# Patient Record
Sex: FEMALE | Race: WHITE
[De-identification: names, ages, dates, MRNs, and addresses within clinical notes are randomized per-mention and may not be internally consistent; named-entity substitution may affect disease eponyms.]

---

## 2017-03-20 ENCOUNTER — HOSPITAL ENCOUNTER (OUTPATIENT)
Dept: HOSPITAL 35 - ULTRA | Age: 82
End: 2017-03-20
Attending: FAMILY MEDICINE
Payer: COMMERCIAL

## 2017-03-20 DIAGNOSIS — M79.604: ICD-10-CM

## 2017-03-20 DIAGNOSIS — I82.401: Primary | ICD-10-CM

## 2017-03-20 DIAGNOSIS — M79.89: ICD-10-CM

## 2017-06-05 ENCOUNTER — HOSPITAL ENCOUNTER (OUTPATIENT)
Dept: HOSPITAL 35 - MRI | Age: 82
End: 2017-06-05
Attending: FAMILY MEDICINE
Payer: COMMERCIAL

## 2017-06-05 DIAGNOSIS — M48.06: ICD-10-CM

## 2017-06-05 DIAGNOSIS — M51.36: Primary | ICD-10-CM

## 2017-06-20 ENCOUNTER — HOSPITAL ENCOUNTER (OUTPATIENT)
Dept: HOSPITAL 35 - PAIN | Age: 82
Discharge: HOME | End: 2017-06-20
Attending: ANESTHESIOLOGY
Payer: COMMERCIAL

## 2017-06-20 VITALS — HEIGHT: 65.98 IN | WEIGHT: 144 LBS | BODY MASS INDEX: 23.14 KG/M2

## 2017-06-20 VITALS — SYSTOLIC BLOOD PRESSURE: 192 MMHG | DIASTOLIC BLOOD PRESSURE: 100 MMHG

## 2017-06-20 DIAGNOSIS — M19.90: ICD-10-CM

## 2017-06-20 DIAGNOSIS — M47.27: ICD-10-CM

## 2017-06-20 DIAGNOSIS — M48.06: ICD-10-CM

## 2017-06-20 DIAGNOSIS — M46.96: ICD-10-CM

## 2017-06-20 DIAGNOSIS — M51.16: Primary | ICD-10-CM

## 2017-06-20 DIAGNOSIS — I25.10: ICD-10-CM

## 2017-06-20 DIAGNOSIS — I10: ICD-10-CM

## 2017-06-20 DIAGNOSIS — E03.9: ICD-10-CM

## 2017-06-20 DIAGNOSIS — Z79.01: ICD-10-CM

## 2017-06-20 DIAGNOSIS — Z79.899: ICD-10-CM

## 2017-06-20 DIAGNOSIS — G89.29: ICD-10-CM

## 2017-06-20 DIAGNOSIS — K21.9: ICD-10-CM

## 2017-06-20 DIAGNOSIS — E78.5: ICD-10-CM

## 2017-06-20 LAB
INR PPP: 1.2
PROTHROMBIN TIME: 12 SECONDS (ref 9.3–11.4)

## 2017-06-29 ENCOUNTER — HOSPITAL ENCOUNTER (OUTPATIENT)
Dept: HOSPITAL 61 - PCVCCLINIC | Age: 82
Discharge: HOME | End: 2017-06-29
Attending: INTERNAL MEDICINE
Payer: MEDICARE

## 2017-06-29 DIAGNOSIS — I10: ICD-10-CM

## 2017-06-29 DIAGNOSIS — Z88.2: ICD-10-CM

## 2017-06-29 DIAGNOSIS — Z86.718: ICD-10-CM

## 2017-06-29 DIAGNOSIS — E78.5: ICD-10-CM

## 2017-06-29 DIAGNOSIS — I48.91: Primary | ICD-10-CM

## 2017-06-29 DIAGNOSIS — R43.2: ICD-10-CM

## 2017-06-29 DIAGNOSIS — Z79.01: ICD-10-CM

## 2017-06-29 DIAGNOSIS — Z79.899: ICD-10-CM

## 2017-06-29 PROCEDURE — G0463 HOSPITAL OUTPT CLINIC VISIT: HCPCS

## 2017-07-11 ENCOUNTER — HOSPITAL ENCOUNTER (OUTPATIENT)
Dept: HOSPITAL 61 - PCVCIMAG | Age: 82
Discharge: HOME | End: 2017-07-11
Attending: INTERNAL MEDICINE
Payer: MEDICARE

## 2017-07-11 DIAGNOSIS — E78.5: ICD-10-CM

## 2017-07-11 DIAGNOSIS — I45.10: Primary | ICD-10-CM

## 2017-07-11 DIAGNOSIS — I10: ICD-10-CM

## 2017-07-11 DIAGNOSIS — Z79.01: ICD-10-CM

## 2017-07-11 DIAGNOSIS — I48.92: ICD-10-CM

## 2017-07-11 DIAGNOSIS — I48.91: ICD-10-CM

## 2017-07-11 PROCEDURE — 78452 HT MUSCLE IMAGE SPECT MULT: CPT

## 2017-07-11 PROCEDURE — 93017 CV STRESS TEST TRACING ONLY: CPT

## 2017-07-11 PROCEDURE — A9500 TC99M SESTAMIBI: HCPCS

## 2017-07-13 NOTE — PCVCIMAG
--------------- APPROVED REPORT --------------





Exam: Nuclear Stress Test

Indication: Afib

Patient Location: Out-Patient

Stress Nurse: Carmita Mariano RN

NM Tech:Nacho Maurer NMTCB



Ht: 5 ft 6 in Wt: 140 lbs BSA:  1.72 m2

HR: 68 bpm                      BP: 191/84 mmHg         BMI:  

22.5

Rhythm:  RBBB



Medical History

Medical History: Age, HTN, Afib,

Medications: Lisinopril, Mevacor, Coumadin

Allergies: Bactrim

Pretest Chest Pain Characteristics: No chest pain

Exercise History: Physically active

Physical Disabilities: Uses a walker



NM EXAM: Myocardial Perfusion REST/STRESS

Imaging Protocol: Rest Tc-99m/Stress Tc-99m 1 day



Resting Data

Rest SPECT myocardial perfusion imaging was performed in supine 

position 45 minutes following the intravenous injection of 8.4 mCi of 

Tc-99m Sestamibi.

Time of rest injection: 0910     Date: 07/11/2017



Pharmacologic Stress

Pharmacologic stress test was performed by injecting Regadenoson 0.4 

mg IV push followed by the intravenous injection of 25 mCi of Tc-99m 

Sestamibi.

Time of stress injection: 1030     Date: 07/11/2017

The images were gated to evaluate regional wall motion and calculate 

left ventricular ejection fraction. 



Study Data

Post stress, the left ventricular ejection was 67%..

SSS: 0

SRS: 0

SDS: 0

TID = 0.78.



Perfusion

Normal left ventricular perfusion.



Wall Motion

Normal left ventricular wall motion.



Nuclear Conclusion

1. low risk study 



Interpreted by:  Ghassan Goins MD

Electronically Approved: 07/13/2017 16:07:56



Stress Test Details

Stress Test:  Pharmacologic stress testing performed using 0.4 mg of 

regadenoson per 5 mL given IV over 10 seconds.

  Reason for pharmacologic stress test: physical 

limitation.

HR

Resting HR:            68 bpmMax Heart Rate (APMHR): 135 bpm 

Max HR Achieved:  89 bpmTarget HR (85% APMHR): 114 bpm

% of APMHR:         65

Recovery HR:            85 bpm



BP

Resting BP:  191/84 mmHg

Max BP:       169/74 mmHg

Recovery BP:       140/62 mmHg

ECG

Resting ECG:  Sinus Rhythm, RBBB

Stress ECG:     Sinus Rhythm, RBBB

Recovery ECG: Sinus Rhythm, RBBB



Clinical

Reason for Termination: Completed protocol

Stress Symptoms: Chest Pain,

Symptoms resolved with caffeine.



Stress ECG Conclusion

1. ADEQUATE RESPONSE TO IV LEXISCAN

2. INADEQUATE HEART RATE FOR ECG DIAGNOSIS



&lt;Conclusion&gt;

1. ADEQUATE RESPONSE TO IV LEXISCAN

2. INADEQUATE HEART RATE FOR ECG DIAGNOSIS

## 2017-07-18 ENCOUNTER — HOSPITAL ENCOUNTER (OUTPATIENT)
Dept: HOSPITAL 35 - PAIN | Age: 82
Discharge: HOME | End: 2017-07-18
Attending: ANESTHESIOLOGY
Payer: COMMERCIAL

## 2017-07-18 VITALS — HEIGHT: 65.98 IN | BODY MASS INDEX: 22.63 KG/M2 | WEIGHT: 140.8 LBS

## 2017-07-18 VITALS — DIASTOLIC BLOOD PRESSURE: 75 MMHG | SYSTOLIC BLOOD PRESSURE: 155 MMHG

## 2017-07-18 DIAGNOSIS — Z79.899: ICD-10-CM

## 2017-07-18 DIAGNOSIS — M47.27: ICD-10-CM

## 2017-07-18 DIAGNOSIS — Z98.890: ICD-10-CM

## 2017-07-18 DIAGNOSIS — M12.88: ICD-10-CM

## 2017-07-18 DIAGNOSIS — M51.16: Primary | ICD-10-CM

## 2017-07-18 DIAGNOSIS — G89.29: ICD-10-CM

## 2017-07-18 DIAGNOSIS — M48.06: ICD-10-CM

## 2017-09-10 ENCOUNTER — HOSPITAL ENCOUNTER (INPATIENT)
Dept: HOSPITAL 35 - ER | Age: 82
LOS: 5 days | Discharge: NURSING, REHAB OR LONG-TERM CARE | DRG: 552 | End: 2017-09-15
Attending: FAMILY MEDICINE | Admitting: FAMILY MEDICINE
Payer: COMMERCIAL

## 2017-09-10 VITALS
SYSTOLIC BLOOD PRESSURE: 188 MMHG | HEIGHT: 65.98 IN | WEIGHT: 126 LBS | BODY MASS INDEX: 20.25 KG/M2 | DIASTOLIC BLOOD PRESSURE: 70 MMHG

## 2017-09-10 VITALS — DIASTOLIC BLOOD PRESSURE: 52 MMHG | SYSTOLIC BLOOD PRESSURE: 127 MMHG

## 2017-09-10 VITALS — SYSTOLIC BLOOD PRESSURE: 162 MMHG | DIASTOLIC BLOOD PRESSURE: 77 MMHG

## 2017-09-10 VITALS — DIASTOLIC BLOOD PRESSURE: 72 MMHG | SYSTOLIC BLOOD PRESSURE: 168 MMHG

## 2017-09-10 VITALS — DIASTOLIC BLOOD PRESSURE: 61 MMHG | SYSTOLIC BLOOD PRESSURE: 145 MMHG
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
## 2017-09-10 DIAGNOSIS — K59.00: ICD-10-CM

## 2017-09-10 DIAGNOSIS — I10: ICD-10-CM

## 2017-09-10 DIAGNOSIS — M19.90: ICD-10-CM

## 2017-09-10 DIAGNOSIS — B96.20: ICD-10-CM

## 2017-09-10 DIAGNOSIS — M48.00: Primary | ICD-10-CM

## 2017-09-10 DIAGNOSIS — R63.4: ICD-10-CM

## 2017-09-10 DIAGNOSIS — I95.89: ICD-10-CM
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
## 2017-09-10 DIAGNOSIS — Z86.718: ICD-10-CM

## 2017-09-10 DIAGNOSIS — Z79.01: ICD-10-CM

## 2017-09-10 DIAGNOSIS — E03.9: ICD-10-CM

## 2017-09-10 DIAGNOSIS — E78.00: ICD-10-CM

## 2017-09-10 DIAGNOSIS — Z79.899: ICD-10-CM

## 2017-09-10 DIAGNOSIS — I48.2: ICD-10-CM
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
## 2017-09-10 DIAGNOSIS — R00.1: ICD-10-CM
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
## 2017-09-10 DIAGNOSIS — N39.0: ICD-10-CM

## 2017-09-10 LAB
ANION GAP SERPL CALC-SCNC: 9 MMOL/L (ref 7–16)
BILIRUB UR-MCNC: NEGATIVE MG/DL
BUN SERPL-MCNC: 37 MG/DL (ref 7–18)
CALCIUM SERPL-MCNC: 9.2 MG/DL (ref 8.5–10.1)
CHLORIDE SERPL-SCNC: 98 MMOL/L (ref 98–107)
CO2 SERPL-SCNC: 28 MMOL/L (ref 21–32)
COLOR UR: YELLOW
CREAT SERPL-MCNC: 1.4 MG/DL (ref 0.6–1)
ERYTHROCYTE [DISTWIDTH] IN BLOOD BY AUTOMATED COUNT: 14.7 % (ref 10.5–14.5)
GLUCOSE SERPL-MCNC: 104 MG/DL (ref 74–106)
HCT VFR BLD CALC: 43.9 % (ref 37–47)
HGB BLD-MCNC: 14.8 GM/DL (ref 12–15)
INR PPP: 1.1
KETONES UR STRIP-MCNC: NEGATIVE MG/DL
MCH RBC QN AUTO: 31.3 PG (ref 26–34)
MCHC RBC AUTO-ENTMCNC: 33.8 G/DL (ref 28–37)
MCV RBC: 92.6 FL (ref 80–100)
PLATELET # BLD: 153 THOU/UL (ref 150–400)
POTASSIUM SERPL-SCNC: 4.2 MMOL/L (ref 3.5–5.1)
PROTHROMBIN TIME: 10.9 SECONDS (ref 9.3–11.4)
RBC # BLD AUTO: 4.74 MIL/UL (ref 4.2–5)
RBC # UR STRIP: NEGATIVE /UL
SODIUM SERPL-SCNC: 135 MMOL/L (ref 136–145)
SP GR UR STRIP: 1.01 (ref 1–1.03)
SQUAMOUS: (no result) /LPF (ref 0–3)
URINE GLUCOSE-RANDOM*: NEGATIVE
URINE LEUKOCYTES-REFLEX: NEGATIVE
URINE PROTEIN (DIPSTICK): NEGATIVE
URINE WBC-REFLEX: (no result) /HPF (ref 0–5)
UROBILINOGEN UR STRIP-ACNC: 0.2 E.U./DL (ref 0.2–1)
WBC # BLD AUTO: 9.9 THOU/UL (ref 4–11)

## 2017-09-10 PROCEDURE — 10091: CPT

## 2017-09-10 PROCEDURE — 10183: CPT

## 2017-09-11 VITALS — DIASTOLIC BLOOD PRESSURE: 63 MMHG | SYSTOLIC BLOOD PRESSURE: 169 MMHG

## 2017-09-11 VITALS — DIASTOLIC BLOOD PRESSURE: 54 MMHG | SYSTOLIC BLOOD PRESSURE: 129 MMHG

## 2017-09-11 VITALS — DIASTOLIC BLOOD PRESSURE: 50 MMHG | SYSTOLIC BLOOD PRESSURE: 119 MMHG

## 2017-09-11 VITALS — DIASTOLIC BLOOD PRESSURE: 61 MMHG | SYSTOLIC BLOOD PRESSURE: 111 MMHG

## 2017-09-11 LAB
INR PPP: 1
PROTHROMBIN TIME: 10.6 SECONDS (ref 9.3–11.4)

## 2017-09-12 VITALS — SYSTOLIC BLOOD PRESSURE: 100 MMHG | DIASTOLIC BLOOD PRESSURE: 40 MMHG

## 2017-09-12 VITALS — SYSTOLIC BLOOD PRESSURE: 145 MMHG | DIASTOLIC BLOOD PRESSURE: 55 MMHG

## 2017-09-12 VITALS — DIASTOLIC BLOOD PRESSURE: 46 MMHG | SYSTOLIC BLOOD PRESSURE: 111 MMHG

## 2017-09-12 VITALS — SYSTOLIC BLOOD PRESSURE: 108 MMHG | DIASTOLIC BLOOD PRESSURE: 50 MMHG

## 2017-09-12 VITALS — DIASTOLIC BLOOD PRESSURE: 61 MMHG | SYSTOLIC BLOOD PRESSURE: 176 MMHG

## 2017-09-13 VITALS — SYSTOLIC BLOOD PRESSURE: 144 MMHG | DIASTOLIC BLOOD PRESSURE: 62 MMHG

## 2017-09-13 VITALS — DIASTOLIC BLOOD PRESSURE: 35 MMHG | SYSTOLIC BLOOD PRESSURE: 98 MMHG

## 2017-09-13 VITALS — SYSTOLIC BLOOD PRESSURE: 71 MMHG | DIASTOLIC BLOOD PRESSURE: 32 MMHG

## 2017-09-13 VITALS — SYSTOLIC BLOOD PRESSURE: 114 MMHG | DIASTOLIC BLOOD PRESSURE: 52 MMHG

## 2017-09-13 VITALS — DIASTOLIC BLOOD PRESSURE: 63 MMHG | SYSTOLIC BLOOD PRESSURE: 165 MMHG

## 2017-09-13 VITALS — DIASTOLIC BLOOD PRESSURE: 51 MMHG | SYSTOLIC BLOOD PRESSURE: 133 MMHG

## 2017-09-13 VITALS — DIASTOLIC BLOOD PRESSURE: 51 MMHG | SYSTOLIC BLOOD PRESSURE: 113 MMHG

## 2017-09-13 LAB
ERYTHROCYTE [DISTWIDTH] IN BLOOD BY AUTOMATED COUNT: 14.7 % (ref 10.5–14.5)
HCT VFR BLD CALC: 37.8 % (ref 37–47)
HGB BLD-MCNC: 12.6 GM/DL (ref 12–15)
MCH RBC QN AUTO: 31.2 PG (ref 26–34)
MCHC RBC AUTO-ENTMCNC: 33.5 G/DL (ref 28–37)
MCV RBC: 93.1 FL (ref 80–100)
PLATELET # BLD: 132 THOU/UL (ref 150–400)
RBC # BLD AUTO: 4.06 MIL/UL (ref 4.2–5)
WBC # BLD AUTO: 7.7 THOU/UL (ref 4–11)

## 2017-09-14 VITALS — DIASTOLIC BLOOD PRESSURE: 50 MMHG | SYSTOLIC BLOOD PRESSURE: 110 MMHG

## 2017-09-14 VITALS — DIASTOLIC BLOOD PRESSURE: 76 MMHG | SYSTOLIC BLOOD PRESSURE: 167 MMHG

## 2017-09-14 VITALS — SYSTOLIC BLOOD PRESSURE: 157 MMHG | DIASTOLIC BLOOD PRESSURE: 87 MMHG

## 2017-09-14 VITALS — DIASTOLIC BLOOD PRESSURE: 49 MMHG | SYSTOLIC BLOOD PRESSURE: 128 MMHG

## 2017-09-15 VITALS — SYSTOLIC BLOOD PRESSURE: 177 MMHG | DIASTOLIC BLOOD PRESSURE: 74 MMHG

## 2017-09-15 VITALS — DIASTOLIC BLOOD PRESSURE: 59 MMHG | SYSTOLIC BLOOD PRESSURE: 147 MMHG

## 2018-05-15 ENCOUNTER — HOSPITAL ENCOUNTER (OUTPATIENT)
Dept: HOSPITAL 61 - PCVCCLINIC | Age: 83
Discharge: HOME | End: 2018-05-15
Attending: INTERNAL MEDICINE
Payer: MEDICARE

## 2018-05-15 DIAGNOSIS — I48.0: ICD-10-CM

## 2018-05-15 DIAGNOSIS — Z79.899: ICD-10-CM

## 2018-05-15 DIAGNOSIS — I10: Primary | ICD-10-CM

## 2018-05-15 PROCEDURE — 80061 LIPID PANEL: CPT

## 2018-08-07 ENCOUNTER — HOSPITAL ENCOUNTER (OUTPATIENT)
Dept: HOSPITAL 35 - PAIN | Age: 83
Discharge: HOME | End: 2018-08-07
Attending: ANESTHESIOLOGY
Payer: COMMERCIAL

## 2018-08-07 VITALS — DIASTOLIC BLOOD PRESSURE: 74 MMHG | SYSTOLIC BLOOD PRESSURE: 136 MMHG

## 2018-08-07 VITALS — HEIGHT: 65.98 IN | WEIGHT: 139.2 LBS | BODY MASS INDEX: 22.37 KG/M2

## 2018-08-07 DIAGNOSIS — M46.96: ICD-10-CM

## 2018-08-07 DIAGNOSIS — Z87.440: ICD-10-CM

## 2018-08-07 DIAGNOSIS — M51.16: Primary | ICD-10-CM

## 2018-08-07 DIAGNOSIS — Z79.899: ICD-10-CM

## 2018-08-07 DIAGNOSIS — G89.29: ICD-10-CM

## 2018-08-07 DIAGNOSIS — M47.27: ICD-10-CM

## 2018-08-07 DIAGNOSIS — Z98.890: ICD-10-CM

## 2018-08-07 DIAGNOSIS — M48.061: ICD-10-CM

## 2018-09-24 ENCOUNTER — HOSPITAL ENCOUNTER (OUTPATIENT)
Dept: HOSPITAL 35 - MRI | Age: 83
End: 2018-09-24
Attending: FAMILY MEDICINE
Payer: COMMERCIAL

## 2018-09-24 DIAGNOSIS — M48.061: ICD-10-CM
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
## 2018-09-24 DIAGNOSIS — S22.080A: Primary | ICD-10-CM
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
## 2018-11-15 ENCOUNTER — HOSPITAL ENCOUNTER (OUTPATIENT)
Dept: HOSPITAL 61 - PCVCCLINIC | Age: 83
Discharge: HOME | End: 2018-11-15
Attending: INTERNAL MEDICINE
Payer: MEDICARE

## 2018-11-15 DIAGNOSIS — I48.91: ICD-10-CM

## 2018-11-15 DIAGNOSIS — E03.9: ICD-10-CM

## 2018-11-15 DIAGNOSIS — E78.5: Primary | ICD-10-CM

## 2018-11-15 DIAGNOSIS — I48.0: ICD-10-CM

## 2018-11-15 PROCEDURE — 80061 LIPID PANEL: CPT

## 2018-11-15 PROCEDURE — 36415 COLL VENOUS BLD VENIPUNCTURE: CPT

## 2018-12-03 ENCOUNTER — HOSPITAL ENCOUNTER (OUTPATIENT)
Dept: HOSPITAL 61 - PCVCIMAG | Age: 83
Discharge: HOME | End: 2018-12-03
Attending: INTERNAL MEDICINE
Payer: MEDICARE

## 2018-12-03 DIAGNOSIS — R60.0: ICD-10-CM

## 2018-12-03 DIAGNOSIS — I08.3: ICD-10-CM

## 2018-12-03 DIAGNOSIS — I48.92: ICD-10-CM

## 2018-12-03 DIAGNOSIS — E78.5: ICD-10-CM

## 2018-12-03 DIAGNOSIS — I10: ICD-10-CM

## 2018-12-03 DIAGNOSIS — I48.0: ICD-10-CM

## 2018-12-03 DIAGNOSIS — Z01.810: Primary | ICD-10-CM

## 2018-12-03 PROCEDURE — 93306 TTE W/DOPPLER COMPLETE: CPT

## 2018-12-03 PROCEDURE — G0463 HOSPITAL OUTPT CLINIC VISIT: HCPCS

## 2018-12-03 NOTE — PCVCIMAG
--------------- APPROVED REPORT --------------





Study performed:  2018 10:20:25



EXAM: Comprehensive 2D, Doppler, and color-flow 

Echocardiogram

Patient Location: Echo lab

Status:  routine



BSA:         1.73

HR: 90 bpmBP:          120/68 mmHg

Rhythm: Atrial Fibrillation



Other Information 

Study Quality: Adequate



Risk Factors: 

Cardiac Risk Factors:  HTN, Hyperlipidemia



Indications

Atrial Fibrillation



2D Dimensions

IVSd:  9.80 (7-11mm)LVOT Diam:  20.00 (18-24mm) 

LVDd:  40.33 mm

PWd:  10.07 (7-11mm)Ascending Ao:  28.40 (22-36mm)

LVDs:  30.26 (25-40mm)

Left Atrium:  41.18 (27-40mm)

Aortic Root:  28.21 mm

LV Single Plane 4CH:  53.06 %

LV Single Plane 2CH:  48.74 %

Biplane EF:  50.3 %



Volumes

Left Atrial Volume (Systole)

Single Plane 4CH:  73.62 mLSingle Plane 2CH:  53.49 mL

LA ESV Index:  40.00 mL/m2



Aortic Valve

AoV Peak Richard.:  0.90 m/s

AO Peak Gr.:  3.26 mmHgLVOT Max P.18 mmHg

LVOT Max V:  0.74 m/s

DALLIN Vmax: 2.58 cm2

AI Vmax:  4.05 m/s

AI [place]:  2.17 m/s2

AI PHT:  553.05 ms



Pulmonary Valve

PV Peak Richard.:  0.71 m/sPV Peak Gr.:  2.04 mmHg



Tricuspid Valve

TR Peak Richard.:  2.51 m/sRAP Estimate:  7.00 mmHg

TR Peak Gr.:  25.26 mmHg

PA Pressure:  32.00 mmHg



Left Ventricle

The left ventricle is normal size. There is normal LV segmental wall 

motion. There is normal left ventricular wall thickness. Left 

ventricular systolic function is normal. The left ventricular 

ejection fraction is within the normal range. LVEF is 50-55%. This 

study is not technically sufficient to allow evaluation of the LV 

diastolic function due to atrial fibrillation.



Right Ventricle

The right ventricle is normal size. The right ventricular systolic 

function is normal.



Atria

Left atrium is mildly dilated. Right atrium is dilated.



Aortic Valve

The Aortic valve is sclerotic. Mild to moderate aortic regurgitation. 

There is no aortic valvular stenosis.



Mitral Valve

There is mitral annular calcification. Mild mitral regurgitation. No 

evidence of mitral valve stenosis.



Tricuspid Valve

The tricuspid valve is normal in structure. Mild tricuspid 

regurgitation. Pulmonary artery pressure is 32 mmHg.



Pulmonic Valve

The pulmonary valve is normal in structure. Trace pulmonic 

regurgitation.



Great Vessels

The aortic root is normal in size. IVC is normal in size and 

collapses &gt;50% with inspiration.



Pericardium

There is no pericardial effusion.



&lt;Conclusion&gt;

The left ventricle is normal size.

LVEF is 50-55%.

Left atrium is mildly dilated.

Right atrium is dilated.

The Aortic valve is sclerotic.

Mild to moderate aortic regurgitation.

There is mitral annular calcification.

Mild mitral regurgitation.

The tricuspid valve is normal in structure.

Mild tricuspid regurgitation. Pulmonary artery pressure is 32 

mmHg.

The pulmonary valve is normal in structure.

Trace pulmonic regurgitation.

There is no pericardial effusion.

## 2019-01-03 ENCOUNTER — HOSPITAL ENCOUNTER (OUTPATIENT)
Dept: HOSPITAL 61 - PCVCCLINIC | Age: 84
Discharge: HOME | End: 2019-01-03
Attending: INTERNAL MEDICINE
Payer: MEDICARE

## 2019-01-03 DIAGNOSIS — I48.91: ICD-10-CM

## 2019-01-03 DIAGNOSIS — I38: ICD-10-CM

## 2019-01-03 DIAGNOSIS — Z01.810: Primary | ICD-10-CM

## 2019-01-03 DIAGNOSIS — I10: ICD-10-CM

## 2019-01-03 PROCEDURE — G0463 HOSPITAL OUTPT CLINIC VISIT: HCPCS
